# Patient Record
Sex: FEMALE | Race: AMERICAN INDIAN OR ALASKA NATIVE
[De-identification: names, ages, dates, MRNs, and addresses within clinical notes are randomized per-mention and may not be internally consistent; named-entity substitution may affect disease eponyms.]

---

## 2022-11-01 ENCOUNTER — HOSPITAL ENCOUNTER (EMERGENCY)
Dept: HOSPITAL 94 - ER | Age: 62
Discharge: HOME | End: 2022-11-01
Payer: MEDICAID

## 2022-11-01 VITALS — SYSTOLIC BLOOD PRESSURE: 167 MMHG | DIASTOLIC BLOOD PRESSURE: 95 MMHG

## 2022-11-01 VITALS — HEIGHT: 62 IN | WEIGHT: 187.39 LBS | BODY MASS INDEX: 34.48 KG/M2
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
## 2022-11-01 DIAGNOSIS — S68.111A: Primary | ICD-10-CM

## 2022-11-01 PROCEDURE — 12002 RPR S/N/AX/GEN/TRNK2.6-7.5CM: CPT

## 2022-11-01 PROCEDURE — 99283 EMERGENCY DEPT VISIT LOW MDM: CPT

## 2022-11-01 PROCEDURE — 96372 THER/PROPH/DIAG INJ SC/IM: CPT

## 2022-11-01 PROCEDURE — 73140 X-RAY EXAM OF FINGER(S): CPT

## 2022-11-01 NOTE — NUR
lt index finger tip irr with nacl+betadine 1000ml 

pt tolerated procedure well with no complaints of discomfort.

md at bedside